# Patient Record
Sex: FEMALE | Race: WHITE | URBAN - METROPOLITAN AREA
[De-identification: names, ages, dates, MRNs, and addresses within clinical notes are randomized per-mention and may not be internally consistent; named-entity substitution may affect disease eponyms.]

---

## 2022-10-21 ENCOUNTER — OFFICE VISIT (OUTPATIENT)
Dept: URBAN - METROPOLITAN AREA CLINIC 44 | Facility: CLINIC | Age: 32
End: 2022-10-21

## 2022-10-21 DIAGNOSIS — H10.45 OTHER CHRONIC ALLERGIC CONJUNCTIVITIS: Primary | ICD-10-CM

## 2022-10-21 DIAGNOSIS — L03.213 PRESEPTAL CELLULITIS: ICD-10-CM

## 2022-10-21 PROCEDURE — 99204 OFFICE O/P NEW MOD 45 MIN: CPT

## 2022-10-21 RX ORDER — CIPROFLOXACIN 500 MG/1
500 MG TABLET, FILM COATED ORAL
Qty: 15 | Refills: 0 | Status: ACTIVE
Start: 2022-10-21

## 2022-10-21 ASSESSMENT — INTRAOCULAR PRESSURE
OS: 14
OD: 14

## 2022-10-21 NOTE — IMPRESSION/PLAN
Impression: Preseptal cellulitis: J61.021. Plan: Pt had sinus infection previously, was given amoxicillin but reports mild improvement. Pt is sensitive to touch and reports pressure at lateral canthus OS. Sent RX for ciprofloxacin 500mg BID PO, will have pt RTC in 3-4 days to check for improvement, sooner if things worsen.

## 2022-10-21 NOTE — IMPRESSION/PLAN
Impression: Other chronic allergic conjunctivitis: H10.45. Plan: Recommend cold compress, lastacaft, pataday, and ATs PRN. RTC if no improvement.

## 2022-10-24 ENCOUNTER — OFFICE VISIT (OUTPATIENT)
Dept: URBAN - METROPOLITAN AREA CLINIC 43 | Facility: CLINIC | Age: 32
End: 2022-10-24

## 2022-10-24 DIAGNOSIS — L03.213 PRESEPTAL CELLULITIS: Primary | ICD-10-CM

## 2022-10-24 PROCEDURE — 99213 OFFICE O/P EST LOW 20 MIN: CPT

## 2022-10-24 ASSESSMENT — INTRAOCULAR PRESSURE
OS: 15
OD: 15

## 2022-10-24 NOTE — IMPRESSION/PLAN
Impression: Preseptal cellulitis: R79.458. Plan: Pt reports improvement of symptoms after using ciprofloxacin. Instructed pt to continue taking it until bottle is out.  RTC 1 month for CE or sooner PRN